# Patient Record
Sex: FEMALE | Race: WHITE | HISPANIC OR LATINO | URBAN - METROPOLITAN AREA
[De-identification: names, ages, dates, MRNs, and addresses within clinical notes are randomized per-mention and may not be internally consistent; named-entity substitution may affect disease eponyms.]

---

## 2024-11-11 ENCOUNTER — APPOINTMENT (RX ONLY)
Dept: URBAN - METROPOLITAN AREA CLINIC 76 | Facility: CLINIC | Age: 59
Setting detail: DERMATOLOGY
End: 2024-11-11

## 2024-11-11 DIAGNOSIS — I78.1 NEVUS, NON-NEOPLASTIC: ICD-10-CM

## 2024-11-11 PROCEDURE — ? INVENTORY

## 2024-11-11 PROCEDURE — ? EXCEL V LASER

## 2024-11-11 PROCEDURE — ? COUNSELING

## 2024-11-11 ASSESSMENT — LOCATION DETAILED DESCRIPTION DERM: LOCATION DETAILED: NASAL DORSUM

## 2024-11-11 ASSESSMENT — LOCATION ZONE DERM: LOCATION ZONE: NOSE

## 2024-11-11 ASSESSMENT — LOCATION SIMPLE DESCRIPTION DERM: LOCATION SIMPLE: NOSE

## 2024-11-11 NOTE — PROCEDURE: EXCEL V LASER
Pre-Procedure Text: After consent was obtained and the procedure was explained, all persons present in the exam room put on their protective eyewear. Cold gel was applied to the treatment areas.
External Cooling Fan Speed: 3
Temperature: 5 C
External Cooling: Myra Cryo 5
Fluence In J: 11 AND 12
Consent: Written consent obtained, risks reviewed including but not limited to crusting, scabbing, blistering, scarring, darker or lighter pigmentary change, and/or incomplete removal. The treatment areas were anesthetized with 20% lidocaine prior to the procedure. 20% lidocaine in plasticized base applied 30 minutes prior to treatment without occlusion and without complication. All persons in the room were wearing goggles during the treatment. Cold gel applied to treatment areas. The treatment areas were treated as noted above. Post-op instructions discussed with patient.
Length Of Topical Anesthesia Application (Optional): 30 minutes
Topical Anesthesia Type: 20% lidocaine
Device Serial Number (Optional): Focal [id number], 5mm, 10ms DONE FIRST
Spot Size: 5
Procedural Text: The treatment areas where then treated as noted above.
Laser Type: KTP 532nm
Pulse Width In Msec: 20
Detail Level: Detailed
Post-Care Instructions: I reviewed with the patient in detail post-care instructions. Patient is to apply vaseline with a q-tip to all crusted areas, and avoid picking at any scabs. Pt should stay away from the sun and wear sun protection until fully healed.
Post-Procedure Text: Following the treatment, ice and broad spectrum sunscreen was applied to the treatment areas.  Post-care instructions were discussed.
Treatment Number (Will Not Render If 0): 1